# Patient Record
Sex: MALE
[De-identification: names, ages, dates, MRNs, and addresses within clinical notes are randomized per-mention and may not be internally consistent; named-entity substitution may affect disease eponyms.]

---

## 2017-04-24 ENCOUNTER — HOSPITAL ENCOUNTER (EMERGENCY)
Dept: HOSPITAL 42 - ED | Age: 67
Discharge: HOME | End: 2017-04-24
Payer: COMMERCIAL

## 2017-04-24 VITALS
HEART RATE: 65 BPM | OXYGEN SATURATION: 98 % | DIASTOLIC BLOOD PRESSURE: 91 MMHG | TEMPERATURE: 98.1 F | SYSTOLIC BLOOD PRESSURE: 153 MMHG

## 2017-04-24 VITALS — BODY MASS INDEX: 38.2 KG/M2

## 2017-04-24 VITALS — RESPIRATION RATE: 18 BRPM

## 2017-04-24 DIAGNOSIS — M79.675: ICD-10-CM

## 2017-04-24 DIAGNOSIS — M25.562: ICD-10-CM

## 2017-04-24 DIAGNOSIS — M54.9: Primary | ICD-10-CM

## 2017-04-24 NOTE — CT
PROCEDURE:  CT HEAD WITHOUT CONTRAST.



HISTORY:

head injury



COMPARISON:

2/2/2013 



TECHNIQUE:

Axial computed tomography images were obtained through the head/brain 

without intravenous contrast.  



Radiation dose:



Total exam DLP = 822.62 mGy-cm.



This CT exam was performed using one or more of the following dose 

reduction techniques: Automated exposure control, adjustment of the 

mA and/or kV according to patient size, and/or use of iterative 

reconstruction technique.



FINDINGS:



HEMORRHAGE:

No intracranial hemorrhage. 



BRAIN:

No mass effect or edema.  Mild diffuse cerebral atrophy consistent 

with age.  Minimal chronic periventricular white matter ischemic 

change.



VENTRICLES:

Unremarkable. No hydrocephalus. 



CALVARIUM:

Unremarkable.



PARANASAL SINUSES:

Chronic pansinusitis. Retention cysts/ polyps in the maxillary 

sinuses.



MASTOID AIR CELLS:

Unremarkable as visualized. No inflammatory changes.



OTHER FINDINGS:

None.



IMPRESSION:

No intracranial hemorrhage. Mild age-appropriate atrophy and minimal 

chronic white matter ischemic change. Chronic pansinusitis.

## 2017-04-24 NOTE — CT
PROCEDURE:  CT Cervical Spine without contrast



HISTORY:

Trauma



COMPARISON:

None available.



TECHNIQUE:

Axial computed tomography images were obtained of the cervical spine 

without the use of intravenous contrast. Coronal and sagittal 

reformatted images were created and reviewed.



Radiation dose: 



Total exam DLP = 714.76 mGy-cm.



This CT exam was performed using one or more of the following dose 

reduction techniques: Automated exposure control, adjustment of the 

mA and/or kV according to patient size, and/or use of iterative 

reconstruction technique.



FINDINGS:



VERTEBRAE:

No fracture. Normal alignment. No destructive bony lesion.



DISCS/SPINAL CANAL/NEURAL FORAMINA:

No significant central canal or neural foraminal stenosis. Discs 

heights are grossly preserved.



PARASPINAL SOFT TISSUES:

Unremarkable. 



OTHER FINDINGS:

None.



IMPRESSION:

No evidence of fracture or dislocation.

## 2017-04-24 NOTE — ED PDOC
Arrival/HPI





<Cash Auguste - Last Filed: 04/24/17 10:59>





- General


Historian: Patient





<Tabitha Mckeon - Last Filed: 04/24/17 12:44>





- General


Chief Complaint: Trauma


Time Seen by Provider: 04/24/17 09:15





- History of Present Illness


Narrative History of Present Illness (Text): 





04/24/17 12:30


67yo male with history of hypertension in ED with complaint of back pain, neck, 

left knee and great toe pain s/p trauma this morning. States the stairs he was 

standing on collapsed and he fell down 3steps on his back. States he felt dizzy 

s/p, but denies LOC. Denies focal weakness, urinary/fecal incontinence, headache

, nausea, vomiting, any other complaint. (Tabitha Mckeon)








Past Medical History





- Tetanus Immunization


Tetanus Immunization: Unknown





- Cardiac


Hx Cardiac Disorders: Yes


Hx Hypertension: Yes





- Pulmonary


Hx Respiratory Disorders: Yes


Hx Asthma: Yes





- Neurological


Hx Neurological Disorder: No





- HEENT


Hx HEENT Disorder: No


Hx Blind: No





- Renal


Hx Renal Disorder: No





- Endocrine/Metabolic


Hx Endocrine Disorders: No





- Hematological/Oncological


Hx Blood Disorders: No





- Integumentary


Hx Dermatological Disorder: No





- Musculoskeletal/Rheumatological


Hx Musculoskeletal Disorders: No





- Gastrointestinal


Hx Gastrointestinal Disorders: No





- Genitourinary/Gynecological


Hx Genitourinary Disorders: No





- Psychiatric


Hx Psychophysiologic Disorder: No


Hx Depression: No


Hx Emotional Abuse: No


Hx Physical Abuse: No


Hx Substance Use: No





- Past Surgical History


Past Surgical History: No Previous





- Surgical History


Hx Musculoskeletal Surgery: Yes (right knee)





- Suicidal Assessment


Feels Threatened In Home Enviroment: No





<Cash Auguste - Last Filed: 04/24/17 10:59>





- Provider Review


Nursing Documentation Reviewed: Yes





<Tabitha Mckeon - Last Filed: 04/24/17 12:44>





Family/Social History


Smoking Status: Never Smoked


Hx Alcohol Use: No


Hx Substance Use: No


Hx Substance Use Treatment: No





<Cash Auguste - Last Filed: 04/24/17 10:59>





- Physician Review


Nursing Documentation Reviewed: Yes


Family/Social History: Unknown Family HX





<Tabitha Mckeon A - Last Filed: 04/24/17 12:44>





Allergies/Home Meds





<Cash Auguste - Last Filed: 04/24/17 10:59>





<Tabitha Mckeon A - Last Filed: 04/24/17 12:44>


Allergies/Adverse Reactions: 


Allergies





No Known Allergies Allergy (Verified 04/24/17 09:07)


 








Home Medications: 


 Home Meds











 Medication  Instructions  Recorded  Confirmed


 


Albuterol/Ipratropium [Duoneb 3 3 ml IH 02/02/13 05/16/14





mg/0.5 mg (3 ml) UD]   


 


Losartan/Hydrochlorothiazide 1 tab PO DAILY 02/02/13 05/16/14





[Hyzaar 100-12.5 Tablet]   


 


Budesonide/Formoterol Fumarate 1 aer  05/16/14 05/16/14





[Symbicort 80-4.5 Mcg Inhaler]   


 


Mometasone/Formoterol [Dulera 200 1 rose  05/16/14 05/16/14





Mcg/5 Mcg Inhaler]   














Review of Systems





- Physician Review


All systems were reviewed & negative as marked: Yes





- Review of Systems


Constitutional: Normal


Eyes: Normal


ENT: Normal


Respiratory: Normal


Cardiovascular: Normal


Gastrointestinal: Normal


Genitourinary Male: Normal


Musculoskeletal: Arthralgias (LEft knee/great toe), Back Pain, Neck Pain


Skin: Normal


Neurological: Normal


Endocrine: Normal


Hemo/Lymphatic: Normal


Psychiatric: Normal





<Tabitha Mckeon A - Last Filed: 04/24/17 12:44>





Physical Exam


Vital Signs Reviewed: Yes


Temperature: Afebrile


Blood Pressure: Normal


Pulse: Regular


Respiratory Rate: Normal


Appearance: Positive for: Well-Appearing, Non-Toxic, Comfortable


Pain Distress: None


Mental Status: Positive for: Alert and Oriented X 3





- Systems Exam


Head: Present: Atraumatic, Normocephalic


Pupils: Present: PERRL


Extroacular Muscles: Present: EOMI


Conjunctiva: Present: Normal


Mouth: Present: Moist Mucous Membranes


Neck: Present: Normal Range of Motion.  No: MIDLINE TENDERNESS, Paraspinal 

Tenderness


Respiratory/Chest: Present: Clear to Auscultation, Good Air Exchange.  No: 

Respiratory Distress, Accessory Muscle Use


Cardiovascular: Present: Regular Rate and Rhythm, Normal S1, S2.  No: Murmurs


Abdomen: Present: Normal Bowel Sounds.  No: Tenderness, Distention, Peritoneal 

Signs


Back: Present: Midline Tenderness, Paraspinal Tenderness (Diffuse back pain).  

No: Pain with Leg Raise


Upper Extremity: Present: Normal Inspection.  No: Cyanosis, Edema


Lower Extremity: Present: NORMAL PULSES, Tenderness (Left knee and left great 

toe), Neurovascularly Intact, Capillary Refill < 2 s.  No: Edema, Cyanosis, 

Normal ROM (Limited on flexion of left knee secondary to pain), Swelling, 

Erythema, Deformity, Temperature Abnormalties


Neurological: Present: GCS=15, CN II-XII Intact, Speech Normal


Skin: Present: Warm, Dry, Normal Color.  No: Rashes


Psychiatric: Present: Alert, Oriented x 3, Normal Insight, Normal Concentration





<Tabitha Mckeon A - Last Filed: 04/24/17 12:44>





Vital Signs











  Temp Pulse Resp BP Pulse Ox


 


 04/24/17 12:20   68  18  158/79 H  97


 


 04/24/17 11:00   69  18  162/86 H  97


 


 04/24/17 09:09  98.7 F  73  16  166/96 H  97

















Medical Decision Making





<Cash Auguste - Last Filed: 04/24/17 10:59>





<Tabitha Mckeon A - Last Filed: 04/24/17 12:44>


ED Course and Treatment: 





04/24/17 11:00














I was available for consultation during PA evaluation. The chart was reviewed 

by me, and I agree with disposition. The documented history was done by the 

physician extender. The documented physical exam was done by the physician 

extender. The documented procedures were done by the physician extender.  (

Cash Auguste)








- RAD Interpretation


Radiology Orders: 











04/24/17 09:21


CERVICAL SPINE W/O CONTRAST [CT] Stat 


HEAD W/O CONTRAST [CT] Stat 





04/24/17 09:22


FOOT LEFT GREAT TOE ROUTINE [RAD] Stat 


LS SPINE WITH OBL > 18 YRS OLD [RAD] Stat 





04/24/17 11:09


KNEE LEFT 2 VIEWS (AP & LAT) [RAD] Stat 

















- Medication Orders


Current Medication Orders: 














Discontinued Medications





Oxycodone/Acetaminophen (Percocet 5/325 Mg Tab)  1 tab PO STAT STA


   Stop: 04/24/17 09:24


   Last Admin: 04/24/17 09:46  Dose: 1 TAB














Disposition/Present on Arrival





- Present on Arrival


History of DVT/PE: No


History of Uncontrolled Diabetes: No


Urinary Catheter: No


History of Decub. Ulcer: No


History Surgical Site Infection Following: None





<Cash Auguste - Last Filed: 04/24/17 10:59>





- Present on Arrival


Any Indicators Present on Arrival: Yes


History of DVT/PE: No


History of Uncontrolled Diabetes: No


Urinary Catheter: No


History of Decub. Ulcer: No


History Surgical Site Infection Following: None





- Disposition


Have Diagnosis and Disposition been Completed?: Yes


Disposition Time: 12:35


Patient Plan: Discharge





<Tabitha Mckeon - Last Filed: 04/24/17 12:44>





- Disposition


Diagnosis: 


 Back pain, Knee pain, Toe pain, Abrasion


Disposition: HOME/ ROUTINE


Condition: STABLE


Discharge Instructions (ExitCare):  Musculoskeletal Pain (ED)


Additional Instructions: 


Follow up with your doctor/Orthopedist


Return to ED For any new or worsening symptoms


Prescriptions: 


Cyclobenzaprine [Flexeril] 5 mg PO BID #10 tab


traMADol [Ultram] 50 mg PO TID #10 tab


Referrals: 


Byron Kearns DO [Staff Provider] - Follow up with primary

## 2017-04-24 NOTE — RAD
PROCEDURE:  Left Foot Radiographs.



HISTORY:

great toe pain s/p trauma  



COMPARISON:

None.



FINDINGS:



BONES:

Normal. No fracture. 



JOINTS:

Mild degenerative changes are seen in the 1st MTP joint. There is 

mild hallux valgus angulation. 



SOFT TISSUES:

Normal. 



OTHER FINDINGS:

None.



IMPRESSION:

No acute fracture

## 2017-04-24 NOTE — RAD
PROCEDURE:  Left Knee Radiographs.



HISTORY:

Pain.



COMPARISON:

None.



FINDINGS:



BONES:

Normal. No fracture. 



JOINTS:

There is mild joint space narrowing in the lateral compartment and 

the patellofemoral joint 



JOINT EFFUSION:

None. 



OTHER FINDINGS:

None.



IMPRESSION:

There is mild joint space narrowing in the lateral compartment and 

the patellofemoral joint

## 2017-05-03 ENCOUNTER — HOSPITAL ENCOUNTER (EMERGENCY)
Dept: HOSPITAL 42 - ED | Age: 67
Discharge: LEFT BEFORE DISCHARGE | End: 2017-05-03
Payer: MEDICARE

## 2017-05-03 VITALS — BODY MASS INDEX: 38.2 KG/M2

## 2017-05-03 VITALS — DIASTOLIC BLOOD PRESSURE: 54 MMHG | SYSTOLIC BLOOD PRESSURE: 168 MMHG

## 2017-05-03 VITALS — TEMPERATURE: 98.2 F | HEART RATE: 64 BPM

## 2017-05-03 VITALS — RESPIRATION RATE: 18 BRPM | OXYGEN SATURATION: 99 %

## 2017-05-03 DIAGNOSIS — J45.901: Primary | ICD-10-CM

## 2017-05-03 LAB
ADD MANUAL DIFF?: NO
ALBUMIN/GLOB SERPL: 1.2 {RATIO} (ref 1.1–1.8)
ALP SERPL-CCNC: 135 U/L (ref 38–133)
ALT SERPL-CCNC: 52 U/L (ref 7–56)
APTT BLD: 27.5 SECONDS (ref 23.7–30.8)
AST SERPL-CCNC: 31 U/L (ref 15–59)
BASOPHILS # BLD AUTO: 0.02 K/MM3 (ref 0–2)
BASOPHILS NFR BLD: 0.3 % (ref 0–3)
BILIRUB SERPL-MCNC: 0.8 MG/DL (ref 0.2–1.3)
BUN SERPL-MCNC: 18 MG/DL (ref 7–21)
CALCIUM SERPL-MCNC: 9.2 MG/DL (ref 8.4–10.5)
CHLORIDE SERPL-SCNC: 99 MMOL/L (ref 98–107)
CO2 SERPL-SCNC: 29 MMOL/L (ref 21–33)
EOSINOPHIL # BLD: 0.5 10*3/UL (ref 0–0.7)
EOSINOPHIL NFR BLD: 7.9 % (ref 1.5–5)
ERYTHROCYTE [DISTWIDTH] IN BLOOD BY AUTOMATED COUNT: 12.6 % (ref 11.5–14.5)
GLOBULIN SER-MCNC: 3.6 GM/DL
GLUCOSE SERPL-MCNC: 110 MG/DL (ref 70–110)
GRANULOCYTES # BLD: 3.68 10*3/UL (ref 1.4–6.5)
GRANULOCYTES NFR BLD: 63 % (ref 50–68)
HCT VFR BLD CALC: 41.4 % (ref 42–52)
INR PPP: 1.04 (ref 0.93–1.08)
LYMPHOCYTES # BLD: 1.3 10*3/UL (ref 1.2–3.4)
LYMPHOCYTES NFR BLD AUTO: 22.3 % (ref 22–35)
MCH RBC QN AUTO: 31 PG (ref 25–35)
MCHC RBC AUTO-ENTMCNC: 35.7 G/DL (ref 31–37)
MCV RBC AUTO: 86.8 FL (ref 80–105)
MONOCYTES # BLD AUTO: 0.4 10*3/UL (ref 0.1–0.6)
MONOCYTES NFR BLD: 6.5 % (ref 1–6)
PLATELET # BLD: 187 10^3/UL (ref 120–450)
PMV BLD AUTO: 9.2 FL (ref 7–11)
POTASSIUM SERPL-SCNC: 3.9 MMOL/L (ref 3.6–5)
PROT SERPL-MCNC: 7.8 G/DL (ref 5.8–8.3)
SODIUM SERPL-SCNC: 138 MMOL/L (ref 132–148)
TROPONIN I SERPL-MCNC: < 0.01 NG/ML
WBC # BLD AUTO: 5.8 10^3/UL (ref 4.5–11)

## 2017-05-03 PROCEDURE — 85610 PROTHROMBIN TIME: CPT

## 2017-05-03 PROCEDURE — 93005 ELECTROCARDIOGRAM TRACING: CPT

## 2017-05-03 PROCEDURE — 83880 ASSAY OF NATRIURETIC PEPTIDE: CPT

## 2017-05-03 PROCEDURE — 80053 COMPREHEN METABOLIC PANEL: CPT

## 2017-05-03 PROCEDURE — 84484 ASSAY OF TROPONIN QUANT: CPT

## 2017-05-03 PROCEDURE — 71010: CPT

## 2017-05-03 PROCEDURE — 96374 THER/PROPH/DIAG INJ IV PUSH: CPT

## 2017-05-03 PROCEDURE — 82550 ASSAY OF CK (CPK): CPT

## 2017-05-03 PROCEDURE — 82948 REAGENT STRIP/BLOOD GLUCOSE: CPT

## 2017-05-03 PROCEDURE — 87040 BLOOD CULTURE FOR BACTERIA: CPT

## 2017-05-03 PROCEDURE — 99284 EMERGENCY DEPT VISIT MOD MDM: CPT

## 2017-05-03 PROCEDURE — 83615 LACTATE (LD) (LDH) ENZYME: CPT

## 2017-05-03 PROCEDURE — 85025 COMPLETE CBC W/AUTO DIFF WBC: CPT

## 2017-05-03 PROCEDURE — 85730 THROMBOPLASTIN TIME PARTIAL: CPT

## 2017-05-03 RX ADMIN — IPRATROPIUM BROMIDE AND ALBUTEROL SULFATE SCH ML: .5; 3 SOLUTION RESPIRATORY (INHALATION) at 14:37

## 2017-05-03 RX ADMIN — IPRATROPIUM BROMIDE AND ALBUTEROL SULFATE SCH ML: .5; 3 SOLUTION RESPIRATORY (INHALATION) at 14:09

## 2017-05-03 RX ADMIN — IPRATROPIUM BROMIDE AND ALBUTEROL SULFATE SCH ML: .5; 3 SOLUTION RESPIRATORY (INHALATION) at 13:42

## 2017-05-03 NOTE — CARD
--------------- APPROVED REPORT --------------





EKG Measurement

Heart [id number]

[place] [id number]

[id number]

[id number]

[id number]



<Conclusion>

*** Poor data quality, interpretation may be adversely affected

Sinus bradycardia

Otherwise normal ECG

## 2017-05-03 NOTE — ED PDOC
Arrival/HPI





- General


Time Seen by Provider: 05/03/17 13:20


Historian: Patient





- History of Present Illness


Narrative History of Present Illness (Text): 





05/03/17 13:26


Carlos Zaragoza is a 66 year old male, with a history of asthma, presents to the 

emergency department complaining of several week duration of shortness of 

breath. Patient was evaluated by pulmonoloist, , today and was 

advised to present to emergency department for further evaluation. States that 

he used inhaler treatments at home today morning for minimal relief. States 

that shortness of breath is worsened with exertion. Denies chest pain. Denies 

fever, chills, headache, dizziness, nausea, vomiting, diarrhea, or any other 

complaints at this time. 





Time/Duration: > week (several weeks )


Symptom Onset: Gradual


Symptom Course: Worsening


Severity Level: Mild


Activities at Onset: Light





Past Medical History





- Provider Review


Nursing Documentation Reviewed: Yes





- Tetanus Immunization


Tetanus Immunization: Unknown





- Cardiac


Hx Cardiac Disorders: Yes


Hx Hypertension: Yes





- Pulmonary


Hx Respiratory Disorders: Yes


Hx Asthma: Yes





- Neurological


Hx Neurological Disorder: No





- HEENT


Hx HEENT Disorder: No


Hx Blind: No





- Renal


Hx Renal Disorder: No





- Endocrine/Metabolic


Hx Endocrine Disorders: No





- Hematological/Oncological


Hx Blood Disorders: No





- Integumentary


Hx Dermatological Disorder: No





- Musculoskeletal/Rheumatological


Hx Musculoskeletal Disorders: No





- Gastrointestinal


Hx Gastrointestinal Disorders: No





- Genitourinary/Gynecological


Hx Genitourinary Disorders: No





- Psychiatric


Hx Psychophysiologic Disorder: No


Hx Depression: No


Hx Emotional Abuse: No


Hx Physical Abuse: No


Hx Substance Use: No





- Past Surgical History


Past Surgical History: No Previous





- Surgical History


Hx Musculoskeletal Surgery: Yes (right knee)





- Suicidal Assessment


Feels Threatened In Home Enviroment: No





Family/Social History





- Physician Review


Nursing Documentation Reviewed: Yes


Family/Social History: No Known Family HX


Smoking Status: Never Smoked


Hx Alcohol Use: No


Hx Substance Use: No


Hx Substance Use Treatment: No





Allergies/Home Meds


Allergies/Adverse Reactions: 


Allergies





No Known Allergies Allergy (Verified 05/03/17 13:26)


 








Home Medications: 


 Home Meds











 Medication  Instructions  Recorded  Confirmed


 


Albuterol/Ipratropium [Duoneb 3 3 ml IH 02/02/13 05/16/14





mg/0.5 mg (3 ml) UD]   


 


Losartan/Hydrochlorothiazide 1 tab PO DAILY 02/02/13 05/16/14





[Hyzaar 100-12.5 Tablet]   


 


Budesonide/Formoterol Fumarate 1 aer  05/16/14 05/16/14





[Symbicort 80-4.5 Mcg Inhaler]   


 


Mometasone/Formoterol [Dulera 200 1 rose  05/16/14 05/16/14





Mcg/5 Mcg Inhaler]   














Review of Systems





- Review of Systems


Constitutional: absent: Fatigue, Fevers


Respiratory: SOB, Wheezing.  absent: Cough, Sputum


Cardiovascular: [place]AMM[place].  absent: Chest Pain, Palpitations, Edema, Calf Pain


Gastrointestinal: absent: Abdominal Pain, Diarrhea, Nausea, Vomiting


Genitourinary Male: absent: Dysuria, Frequency


Musculoskeletal: absent: Back Pain


Skin: absent: Rash


Neurological: absent: Headache, Dizziness





Physical Exam





- Physical Exam


Narrative Physical Exam (Text): 





05/03/17 13:31


Head:  Atraumatic.  Normocephalic. 


Eyes:  PERRL.  EOMI.  Conjunctivae are not pale.


ENT:  Mucous membranes are moist and intact.  Oropharynx is clear and 

symmetric. No pharyngeal erythema or exudates. No drooling or stridor.


Neck:  Supple.  Full ROM.  No JVD.  No lymphadenopathy.


Cardiovascular:  Regular rate.  Regular rhythm.  No murmurs, rubs, or gallops.  

Distal pulses are 2+ and symmetric.


Pulmonary/Chest:  No evidence of respiratory distress. bilateral expiratory 

wheeze with prolonged expiratory phase.  No rales or rhonchi.


Abdominal:  Soft and non-distended.  There is no tenderness.  No rebound, 

guarding, or rigidity.  No organomegaly.  Good bowel sounds. 


Back:  No CVA tenderness.


Extremities:  No edema.   No cyanosis.  No clubbing.  Full range of motion in 

all extremities.  No calf tenderness.


Skin:  Skin is warm and dry.  No petechiae.  No purpura. 


Neurological:  Alert, awake, and oriented to person, place, time, and 

situation.  Normal speech. Motor and sensory intact.


Psychiatric:  Good eye contact.  Normal interaction, affect, and behavior.














Vital Signs Reviewed: Yes


Vital Signs











  Temp Pulse Resp BP Pulse Ox


 


 05/03/17 13:35    18   99


 


 05/03/17 13:19  98.2 F  64  20  168/54 H  98











Temperature: Afebrile


Blood Pressure: Normal


Pulse: Regular


Appearance: Positive for: Non-Toxic, Comfortable


Pain Distress: Mild


Mental Status: Positive for: Alert and Oriented X 3





Medical Decision Making


ED Course and Treatment: 





05/03/17 13:33


Impression: A 66 year old male who presents to the emergency department 

complaining of several week duration of worsening shortness of breath.





Differential Diagnosis include but are not limited to:  





Plan:


-- EKG


-- Labs, cardiac enzymes


-- Chest X-ray


-- Duoneb


-- Solumedrol


-- Blood culture


-- Urine culture


-- Urinalysis


-- Reassess and disposition








Progress Notes:


Patient with diffuse wheezing noted on exam. Patient administered multiple 

nebulizers and iv steroids, on reassessment wheezing persists. He denies chest 

pain, he states he feels better. On re-exam however he is persistently wheezing 

despite multiple nebulizers. Abnormal chest xray reviewed with patient, risk of 

lung disease, cardiac disease reviewed with patient. He states he wishes to go 

home because he feels better and will follow-up with his physician. Risks 

discussed with patient in laymen's terms. He will sign out against medical 

advice.





05/03/17 15:25


Leaving Against Medical Advice (AMA):


The patient is choosing to leave against medical advice.  I have personally 

explained to the patient that choosing to do so may result in permanent bodily 

harm or death.  I have discussed at great length that without further 

evaluation and monitoring there may be unforeseen circumstances and/or 

deterioration causing permanent bodily harm or death as a result of their 

choice. The patient is alert, oriented, and shows the mental capacity to make 

clear decisions regarding the patients health care at this time. The patient 

continues to wish to leave against medical advice.  


The patient has been advised that they should return to the emergency room 

immediately if they change their mind at any time, or if their condition begins 

to change or worsen in any way.














- Lab Interpretations


Microbiology Results: 


Microbiology Results





05/03/17 13:40   Blood   Blood Culture - Preliminary


                            NO GROWTH AFTER 4 DAYS


05/03/17 13:25   Blood   Blood Culture - Preliminary


                            NO GROWTH AFTER 4 DAYS








Lab Results: 








 05/03/17 13:25 





 05/03/17 13:25 





 Lab Results





05/03/17 13:32: POC Glucose (mg/dL) 110


05/03/17 13:25: PT 11.2, INR 1.04, APTT 27.5


05/03/17 13:25: WBC 5.8  D, RBC 4.77, Hgb 14.8, Hct 41.4 L, MCV 86.8, MCH 31.0, 

MCHC 35.7, RDW 12.6, Plt Count 187, MPV 9.2, Gran % 63.0, Lymph % (Auto) 22.3, 

Mono % (Auto) 6.5 H, Eos % (Auto) 7.9 H, Baso % (Auto) 0.3, Gran # 3.68, Lymph 

# 1.3, Mono # 0.4, Eos # 0.5, Baso # 0.02


05/03/17 13:25: Sodium 138, Potassium 3.9, Chloride 99, Carbon Dioxide 29, 

Anion Gap 14, BUN 18, Creatinine 0.8, Est GFR (African Amer) > 60, Est GFR (Non-

Af Amer) > 60, Random Glucose 110, Calcium 9.2, Total Bilirubin 0.8, AST 31, 

ALT 52, Alkaline Phosphatase 135 H, Lactate Dehydrogenase 568, Total Creatine 

Kinase 142, Troponin I < 0.01, NT-Pro-B Natriuret Pep 71.7, Total Protein 7.8, 

Albumin 4.2, Globulin 3.6, Albumin/Globulin Ratio 1.2











- RAD Interpretation


Radiology Orders: 








05/03/17 13:27


CHEST PORTABLE [RAD] Stat 














- Medication Orders


Current Medication Orders: 











Discontinued Medications





Albuterol/Ipratropium (Duoneb 3 Mg/0.5 Mg (3 Ml) Ud)  3 ml IH Q15M [id number]


   Stop: 05/03/17 14:01


   Last Admin: 05/03/17 14:37  Dose: 3 ml





Methylprednisolone (Solu-Medrol)  125 mg IVP STAT STA


   Stop: 05/03/17 13:28


   Last Admin: 05/03/17 13:42  Dose: 125 mg











- Julianneibe Statement


The provider has reviewed the documentation as recorded by the Yana Stockton 


Provider Attestation: 








All medical record entries made by the Yana were at my direction and 

personally dictated by me. I have reviewed the chart and agree that the record 

accurately reflects my personal performance of the history, physical exam, 

medical decision making, and the department course for this patient. I have 

also personally directed, reviewed, and agree with the discharge instructions 

and disposition.





Disposition/Present on Arrival





- Present on Arrival


Any Indicators Present on Arrival: No


History of DVT/PE: No


History of Uncontrolled Diabetes: No


Urinary Catheter: No


History Surgical Site Infection Following: None





- Disposition


Have Diagnosis and Disposition been Completed?: Yes


Diagnosis: 


 Asthma exacerbation





Disposition: AGAINST MEDICAL ADVICE


Disposition Time: 15:45


Patient Plan: Discharge


Condition: GOOD


Referrals: 


Rafy Noel MD [Primary Care Provider] - Follow up with primary

## 2017-05-03 NOTE — RAD
HISTORY:

sob  



COMPARISON:

1/23/2017 



FINDINGS:



LUNGS:

No active pulmonary disease.



PLEURA:

No significant pleural effusion identified, no pneumothorax apparent.



CARDIOVASCULAR:

Cardiomegaly slightly more conspicuous -possibly due to interval 

change in technique



 Minimal pulmonary venous congestion possible 



OSSEOUS STRUCTURES:

Thoracic spondylosis



VISUALIZED UPPER ABDOMEN:

Normal.



OTHER FINDINGS:

None.



IMPRESSION:

Cardiomegaly  minimal pulmonary venous congestion possible. No 

pleural effusion or consolidation

## 2018-05-27 ENCOUNTER — HOSPITAL ENCOUNTER (EMERGENCY)
Dept: HOSPITAL 42 - ED | Age: 68
Discharge: HOME | End: 2018-05-27
Payer: MEDICARE

## 2018-05-27 VITALS — HEART RATE: 64 BPM | DIASTOLIC BLOOD PRESSURE: 64 MMHG | SYSTOLIC BLOOD PRESSURE: 126 MMHG

## 2018-05-27 VITALS — RESPIRATION RATE: 18 BRPM

## 2018-05-27 VITALS — OXYGEN SATURATION: 98 % | TEMPERATURE: 98.1 F

## 2018-05-27 VITALS — BODY MASS INDEX: 34.9 KG/M2

## 2018-05-27 DIAGNOSIS — I10: ICD-10-CM

## 2018-05-27 DIAGNOSIS — R07.89: ICD-10-CM

## 2018-05-27 DIAGNOSIS — E78.5: ICD-10-CM

## 2018-05-27 DIAGNOSIS — J40: ICD-10-CM

## 2018-05-27 DIAGNOSIS — J45.901: Primary | ICD-10-CM

## 2018-05-27 DIAGNOSIS — I25.10: ICD-10-CM

## 2018-05-27 LAB
ALBUMIN SERPL-MCNC: 4 G/DL (ref 3–4.8)
ALBUMIN/GLOB SERPL: 1.4 {RATIO} (ref 1.1–1.8)
ALT SERPL-CCNC: 43 U/L (ref 7–56)
ARTERIAL BLOOD GAS O2 SAT: 99.2 % (ref 95–98)
ARTERIAL BLOOD GAS PCO2: 29 MM/HG (ref 35–45)
ARTERIAL BLOOD GAS TCO2: 25.7 MMOL.L (ref 22–28)
AST SERPL-CCNC: 31 U/L (ref 17–59)
BASOPHILS # BLD AUTO: 0.02 K/MM3 (ref 0–2)
BASOPHILS NFR BLD: 0.4 % (ref 0–3)
BUN SERPL-MCNC: 18 MG/DL (ref 7–21)
CALCIUM SERPL-MCNC: 9.4 MG/DL (ref 8.4–10.5)
EOSINOPHIL # BLD: 0.4 10*3/UL (ref 0–0.7)
EOSINOPHIL NFR BLD: 6.7 % (ref 1.5–5)
ERYTHROCYTE [DISTWIDTH] IN BLOOD BY AUTOMATED COUNT: 12.8 % (ref 11.5–14.5)
GFR NON-AFRICAN AMERICAN: > 60
GRANULOCYTES # BLD: 3.34 10*3/UL (ref 1.4–6.5)
GRANULOCYTES NFR BLD: 60.5 % (ref 50–68)
HCO3 BLDA-SCNC: 24.8 MMOL/L (ref 21–28)
HGB BLD-MCNC: 13.8 G/DL (ref 14–18)
INHALED O2 CONCENTRATION: 40 %
LYMPHOCYTES # BLD: 1.4 10*3/UL (ref 1.2–3.4)
LYMPHOCYTES NFR BLD AUTO: 24.8 % (ref 22–35)
MCH RBC QN AUTO: 31.2 PG (ref 25–35)
MCHC RBC AUTO-ENTMCNC: 36.5 G/DL (ref 31–37)
MCV RBC AUTO: 85.5 FL (ref 80–105)
MONOCYTES # BLD AUTO: 0.4 10*3/UL (ref 0.1–0.6)
MONOCYTES NFR BLD: 7.6 % (ref 1–6)
PH BLDA: 7.54 [PH] (ref 7.35–7.45)
PLATELET # BLD: 197 10^3/UL (ref 120–450)
PMV BLD AUTO: 9.5 FL (ref 7–11)
PO2 BLDA: 191 MM/HG (ref 80–100)
RBC # BLD AUTO: 4.42 10^6/UL (ref 3.5–6.1)
TROPONIN I SERPL-MCNC: < 0.01 NG/ML
WBC # BLD AUTO: 5.5 10^3/UL (ref 4.5–11)

## 2018-05-27 PROCEDURE — 80053 COMPREHEN METABOLIC PANEL: CPT

## 2018-05-27 PROCEDURE — 36600 WITHDRAWAL OF ARTERIAL BLOOD: CPT

## 2018-05-27 PROCEDURE — 96374 THER/PROPH/DIAG INJ IV PUSH: CPT

## 2018-05-27 PROCEDURE — 82803 BLOOD GASES ANY COMBINATION: CPT

## 2018-05-27 PROCEDURE — 85025 COMPLETE CBC W/AUTO DIFF WBC: CPT

## 2018-05-27 PROCEDURE — 82550 ASSAY OF CK (CPK): CPT

## 2018-05-27 PROCEDURE — 83615 LACTATE (LD) (LDH) ENZYME: CPT

## 2018-05-27 PROCEDURE — 99285 EMERGENCY DEPT VISIT HI MDM: CPT

## 2018-05-27 PROCEDURE — 71045 X-RAY EXAM CHEST 1 VIEW: CPT

## 2018-05-27 PROCEDURE — 93005 ELECTROCARDIOGRAM TRACING: CPT

## 2018-05-27 PROCEDURE — 83735 ASSAY OF MAGNESIUM: CPT

## 2018-05-27 PROCEDURE — 84484 ASSAY OF TROPONIN QUANT: CPT

## 2018-05-27 RX ADMIN — IPRATROPIUM BROMIDE AND ALBUTEROL SULFATE SCH ML: .5; 3 SOLUTION RESPIRATORY (INHALATION) at 11:20

## 2018-05-27 RX ADMIN — IPRATROPIUM BROMIDE AND ALBUTEROL SULFATE SCH ML: .5; 3 SOLUTION RESPIRATORY (INHALATION) at 11:37

## 2018-05-27 RX ADMIN — IPRATROPIUM BROMIDE AND ALBUTEROL SULFATE SCH ML: .5; 3 SOLUTION RESPIRATORY (INHALATION) at 11:52

## 2018-05-27 NOTE — ED PDOC
Arrival/HPI





- General


Chief Complaint: Shortness Of Breath


Time Seen by Provider: 05/27/18 11:22


Historian: Patient





- History of Present Illness


Narrative History of Present Illness (Text): 





05/27/18 11:32


This 68 yo male with pmh asthma, COPD, HTN, hyperlipidemia, CAD ( 2 coronary 

stents in 2015) presents to this ED c/o intermittent SOB x 2 weeks.  Patient 

stated last episode started 7 hours ago with cough, mid-sternal CP.  Patient 

feels mild dizzy when he cough.  Patient denies fever, recent travel, sick 

contact, neck stiffness, palpitation, n/v, abdominal pain, dysarthria, dysphagia

, ha, or diplopia.





Patient stated he had an echocardiorgram, EKG, and nuclear stress test x 7 

months ago, done by Dr. Azar, Cardiologist.


Patient saw Dr. Azar again, 2 months ago for medical clearance (cardiology) 

for surgery.  He said cardiologist stated patient was fit for surgery.





Dr. Yan, Pulmonologist


Dr. Ly, PMD


Dr. Azar, Cardiologist


Time/Duration: Other (see hpi)


Context: Home





Past Medical History





- Provider Review


Nursing Documentation Reviewed: Yes





- Infectious Disease


Hx of Infectious Diseases: None





- Tetanus Immunization


Tetanus Immunization: Unknown





- Cardiac


Hx Cardiac Disorders: Yes


Hx Hypertension: Yes





- Pulmonary


Hx Respiratory Disorders: Yes


Hx Asthma: Yes





- Neurological


Hx Neurological Disorder: No





- HEENT


Hx HEENT Disorder: No


Hx Blind: No





- Renal


Hx Renal Disorder: No





- Endocrine/Metabolic


Hx Endocrine Disorders: No





- Hematological/Oncological


Hx Blood Disorders: No





- Integumentary


Hx Dermatological Disorder: No





- Musculoskeletal/Rheumatological


Hx Musculoskeletal Disorders: No





- Gastrointestinal


Hx Gastrointestinal Disorders: No





- Genitourinary/Gynecological


Hx Genitourinary Disorders: No





- Psychiatric


Hx Psychophysiologic Disorder: No


Hx Depression: No


Hx Emotional Abuse: No


Hx Physical Abuse: No


Hx Substance Use: No





- Past Surgical History


Past Surgical History: No Previous





- Surgical History


Hx Musculoskeletal Surgery: Yes (right knee)





- Anesthesia


Hx Anesthesia: Yes


Hx Anesthesia Reactions: No


Hx Malignant Hyperthermia: No





- Suicidal Assessment


Feels Threatened In Home Enviroment: No





Family/Social History





- Physician Review


Nursing Documentation Reviewed: Yes


Family/Social History: Other (noncontributory)


Smoking Status: Never Smoked


Hx Alcohol Use: No


Hx Substance Use: No


Hx Substance Use Treatment: No





Allergies/Home Meds


Allergies/Adverse Reactions: 


Allergies





No Known Allergies Allergy (Verified 05/03/17 13:26)


 








Home Medications: 


 Home Meds











 Medication  Instructions  Recorded  Confirmed


 


Albuterol Sulfate [Proair Hfa] 1 puff IH QID 05/27/18 05/27/18


 


Atorvastatin [Lipitor] 1 tab PO HS 05/27/18 05/27/18


 


Dexlansoprazole [Dexilant] 1 tab PO DAILY 05/27/18 05/27/18


 


Mometasone/Formoterol [Dulera 200 1 puff IH DAILY 05/27/18 05/27/18





Mcg/5 Mcg Inhaler]   


 


Tiotropium [Spiriva] 1 cap IH DAILY 05/27/18 05/27/18


 


Valsartan/Hydrochlorothiazide 1 tab PO DAILY 05/27/18 05/27/18





[Valsartan-Hctz 320-25 mg Tab]   


 


amLODIPine [Norvasc] 1 tab PO DAILY 05/27/18 05/27/18














Review of Systems





- Review of Systems


Constitutional: Normal.  absent: Fatigue, Weight Change, Fevers


Eyes: Normal


ENT: Normal


Respiratory: SOB, Cough, Sputum, Wheezing


Cardiovascular: Chest Pain.  absent: Palpitations, Edema, Calf Pain, [place], 

Orthopnea, Syncope


Gastrointestinal: Normal.  absent: Abdominal Pain, Nausea, Vomiting


Genitourinary Male: Normal.  absent: Dysuria, Frequency, Hematuria


Musculoskeletal: Normal.  absent: Back Pain, Neck Pain, Myalgias


Skin: Normal.  absent: Rash


Neurological: Normal.  absent: Headache, Dizziness, Focal Weakness, Gait Changes

, Speech Changes, Facial Droop, Disequilibrium, Seizure


Endocrine: Normal


Hemo/Lymphatic: Normal


Psychiatric: Normal





Physical Exam


Vital Signs











  Temp Pulse Resp BP Pulse Ox


 


 05/27/18 16:13   69  18  128/68  98


 


 05/27/18 14:13   74  18  131/71  98


 


 05/27/18 12:28   86  18  138/78  98


 


 05/27/18 11:39    24   98


 


 05/27/18 11:19  98.1 F  94 H  20  142/83  98











Temperature: Afebrile


Blood Pressure: Normal


Pulse: Regular


Respiratory Rate: Normal


Appearance: Positive for: Well-Appearing, Non-Toxic, Comfortable


Pain Distress: None


Mental Status: Positive for: Alert and Oriented X 3





- Systems Exam


Head: Present: Atraumatic, Normocephalic


Pupils: Present: PERRL


Extroacular Muscles: Present: EOMI


Conjunctiva: Present: Normal


Mouth: Present: Moist Mucous Membranes


Neck: Present: Normal Range of Motion


Respiratory/Chest: Present: Wheezes, Decreased Breath Sounds.  No: Respiratory 

Distress, Accessory Muscle Use, Rales, Retracting, Rhonchi, Tachypneic, Tender 

to Palpation


Cardiovascular: Present: Regular Rate and Rhythm, Normal S1, S2.  No: Murmurs


Abdomen: No: Tenderness, Distention, Peritoneal Signs, Rebound, Guarding


Back: Present: Normal Inspection.  No: CVA Tenderness


Upper Extremity: Present: Normal Inspection, Normal ROM.  No: Cyanosis, Edema


Lower Extremity: Present: Normal Inspection, Normal ROM.  No: Edema


Neurological: Present: GCS=15, CN II-XII Intact, Speech Normal, Motor Func 

Grossly Intact, Normal Sensory Function, Normal Cerebellar Funct, Gait Normal, 

Memory Normal


Skin: Present: Warm, Dry, Normal Color.  No: Rashes


Psychiatric: Present: Alert, Oriented x 3, Normal Insight, Normal Concentration





Medical Decision Making


ED Course and Treatment: 





05/27/18 13:46


Patient stated he is feeling well, and his symptoms had significantly improved.

  I told patient due to his risk factors, I will order a repeat Troponin and 

EKG again in 5 hours.  Patient agreed with plan.


05/27/18 18:09


Repeat EKG:  NSR @ 82 bpm.  No ST changes .  No changes from previous EKG


05/27/18 18:24


Re-evaluation.  Patient feels better.  Discussed results and plan with patient 

who expresses understanding.  All questions answered and there is agreement 

with the plan to discharge home with instructions.  Patient stable for 

discharge.  Return if symptoms persist or worsen.





Lungs CTA b/l, no CP, No SOB, no wheezing, rales, or rhonchi.  Second Troponin 

was negative.  Patient is requesting Prednisone prescription, and Albuterol to 

control his asthma symptoms.  He said he understands Prednisone and Solumedro 

are associated with increased risk for DM, and AVN, osteoporosis, glaucoma.  He 

knows the risk, bu he insisted to get prescription for Prednisone.  He said he 

will contact PMD in 2 days.  Patient was recommended to return to emergency if 

symptoms worsen.  Patient understood that he can stop taking Prednisone at 

anytime if symptoms resolves.


Re-evaluation Time: 18:28


Reassessment Condition: Re-examined, Improved





- Lab Interpretations


Lab Results: 








 05/27/18 11:30 





 05/27/18 11:30 





 Lab Results





05/27/18 17:15: Troponin I < 0.01


05/27/18 11:45: pCO2 29 L, pO2 191.0 H, HCO3 24.8, ABG pH 7.54 H, ABG Total CO2 

25.7, ABG O2 Saturation 99.2 H, ABG Base Excess 3.1 H, ABG Potassium 2.9 L, 

Glucose 270 H, Lactate 1.7, FiO2 40.0, Sodium 139.0, Chloride 108.0 H, Arterial 

Blood Potassium 2.9 L


05/27/18 11:30: Sodium 141, Potassium 4.2, Chloride 98, Carbon Dioxide 30, 

Anion Gap 17, BUN 18, Creatinine 0.8, Est GFR (African Amer) > 60, Est GFR (Non-

Af Amer) > 60, Random Glucose 270 H, Calcium 9.4, Magnesium 1.7, Total 

Bilirubin 0.7, AST 31, ALT 43, Alkaline Phosphatase 155 H, Lactate 

Dehydrogenase 623, Total Creatine Kinase 106, Troponin I < 0.01, Total Protein 

6.9, Albumin 4.0, Globulin 2.9, Albumin/Globulin Ratio 1.4


05/27/18 11:30: WBC 5.5, RBC 4.42, Hgb 13.8 L, Hct 37.8 L, MCV 85.5, MCH 31.2, 

MCHC 36.5, RDW 12.8, Plt Count 197, MPV 9.5, Gran % 60.5, Lymph % (Auto) 24.8, 

Mono % (Auto) 7.6 H, Eos % (Auto) 6.7 H, Baso % (Auto) 0.4, Gran # 3.34, Lymph 

# (Auto) 1.4, Mono # (Auto) 0.4, Eos # (Auto) 0.4, Baso # (Auto) 0.02








I have reviewed the lab results: Yes


Interpretation: No clinic. lab abnormalty





- RAD Interpretation


Narrative RAD Interpretations (Text): 





05/27/18 13:15


HISTORY:


SOB





COMPARISON:


Comparison chest 04/30/2018 





FINDINGS:





LUNGS:


Poor inspiration with low lung volumes, crowded bronchovascular markings and 

mild bibasilar atelectasis 





PLEURA:


No significant pleural effusion identified, no pneumothorax apparent.





CARDIOVASCULAR:


Cardiomegaly. 





OSSEOUS STRUCTURES:


No significant abnormalities.





VISUALIZED UPPER ABDOMEN:


Normal.





OTHER FINDINGS:


None.





IMPRESSION:


Poor inspiration with low lung volumes, crowded bronchovascular markings and 

mild bibasilar atelectasis  Cardiomegaly.














Radiology Orders: 








05/27/18 11:33


CHEST PORTABLE [RAD] Stat 














- EKG Interpretation


Interpreted by ED Physician: Yes (NSR @ 69 bpm.  No ST changes)


Type: 12 lead EKG


Comparison: Similar to previous EKG





- Medication Orders


Current Medication Orders: 











Discontinued Medications





Albuterol/Ipratropium (Duoneb 3 Mg/0.5 Mg (3 Ml) Ud)  3 ml IH Q15M [id number]


   Stop: 05/27/18 12:16


   Last Admin: 05/27/18 11:52  Dose: 3 ml





Methylprednisolone (Solu-Medrol)  125 mg IVP STAT STA


   Stop: 05/27/18 11:36


   Last Admin: 05/27/18 11:48  Dose: 125 mg





IVP Administration


 Document     05/27/18 11:48  GMD  (Rec: 05/27/18 11:48  GMD  [id number])


     Charges for Administration


      # of IVP Administrations                   1














Disposition/Present on Arrival





- Present on Arrival


Any Indicators Present on Arrival: No


History of DVT/PE: No


History of Uncontrolled Diabetes: No


Urinary Catheter: No


History of Decub. Ulcer: No


History Surgical Site Infection Following: None





- Disposition


Have Diagnosis and Disposition been Completed?: Yes


Diagnosis: 


 Asthma exacerbation, Chest pain, non-cardiac, Bronchitis





Disposition: HOME/ ROUTINE


Disposition Time: 18:32


Patient Plan: Discharge


Patient Problems: 


 Current Active Problems











Problem Status Onset


 


Asthma exacerbation Acute  


 


Chest pain, non-cardiac Acute  











Condition: IMPROVED


Discharge Instructions (ExitCare):  Acute Bronchitis, Adult (DC), Asthma, Adult 

(DC)


Additional Instructions: 


Call private doctor for follow up visit in 2 days.   Take medication as 

instructed with food.  Call cardiologist for revaluation.  Return to emergency 

if symptoms worsen.


I have prescribed Prednisone at your request.  Remember what we talked in the 

ER.  Prednisone is associated with diabetes, rare hip bone necrosis, glaucoma, 

osteoporosis, high blood pressure.  You could stop Prednisone at anytime.  If 

you are concern about the risk, please see your private doctor in 2 days.  


Prescriptions: 


Albuterol HFA [Ventolin HFA 90 mcg/actuation (8 g)] 2 puff IH [id number] PRN #120 

puff


 PRN Reason: Wheezing


Doxycycline Hyclate 100 mg PO BID #14 capsule


predniSONE [predniSONE Tab] 40 mg PO DAILY #6 tab


Referrals: 


Micheal Ly [Family Provider] - Follow up with primary


Forms:  [employer] (English)

## 2018-05-27 NOTE — RAD
HISTORY:

SOB



COMPARISON:

Comparison chest 04/30/2018 



FINDINGS:



LUNGS:

Poor inspiration with low lung volumes, crowded bronchovascular 

markings and mild bibasilar atelectasis 



PLEURA:

No significant pleural effusion identified, no pneumothorax apparent.



CARDIOVASCULAR:

Cardiomegaly. 



OSSEOUS STRUCTURES:

No significant abnormalities.



VISUALIZED UPPER ABDOMEN:

Normal.



OTHER FINDINGS:

None.



IMPRESSION:

Poor inspiration with low lung volumes, crowded bronchovascular 

markings and mild bibasilar atelectasis  Cardiomegaly.

## 2018-05-28 NOTE — CARD
--------------- APPROVED REPORT --------------





EKG Measurement

Heart [id number]

[id number]

[id number]

[id number]

[id number]



<Conclusion>

Normal sinus rhythm

Prolonged QT

Abnormal ECG

## 2018-05-28 NOTE — CARD
--------------- APPROVED REPORT --------------





EKG Measurement

Heart [id number]

RI [id number]

[id number]

[id number]

[id number]



<Conclusion>

*** Poor data quality, interpretation may be adversely affected

Normal sinus rhythm

Normal ECG

## 2018-10-14 ENCOUNTER — HOSPITAL ENCOUNTER (EMERGENCY)
Dept: HOSPITAL 42 - ED | Age: 68
Discharge: HOME | End: 2018-10-14
Payer: MEDICARE

## 2018-10-14 VITALS
OXYGEN SATURATION: 100 % | DIASTOLIC BLOOD PRESSURE: 63 MMHG | HEART RATE: 60 BPM | RESPIRATION RATE: 20 BRPM | SYSTOLIC BLOOD PRESSURE: 125 MMHG

## 2018-10-14 VITALS — BODY MASS INDEX: 34.9 KG/M2

## 2018-10-14 VITALS — TEMPERATURE: 97.7 F

## 2018-10-14 DIAGNOSIS — E78.5: ICD-10-CM

## 2018-10-14 DIAGNOSIS — J45.909: Primary | ICD-10-CM

## 2018-10-14 DIAGNOSIS — I25.10: ICD-10-CM

## 2018-10-14 DIAGNOSIS — I10: ICD-10-CM

## 2018-10-14 LAB
ALBUMIN SERPL-MCNC: 4.2 G/DL (ref 3–4.8)
ALBUMIN/GLOB SERPL: 1.2 {RATIO} (ref 1.1–1.8)
ALT SERPL-CCNC: 32 U/L (ref 7–56)
AST SERPL-CCNC: 27 U/L (ref 17–59)
BASOPHILS # BLD AUTO: 0.01 K/MM3 (ref 0–2)
BASOPHILS NFR BLD: 0.2 % (ref 0–3)
BUN SERPL-MCNC: 17 MG/DL (ref 7–21)
CALCIUM SERPL-MCNC: 9.1 MG/DL (ref 8.4–10.5)
EOSINOPHIL # BLD: 0.5 10*3/UL (ref 0–0.7)
EOSINOPHIL NFR BLD: 8.1 % (ref 1.5–5)
ERYTHROCYTE [DISTWIDTH] IN BLOOD BY AUTOMATED COUNT: 12.8 % (ref 11.5–14.5)
GFR NON-AFRICAN AMERICAN: > 60
GRANULOCYTES # BLD: 3.96 10*3/UL (ref 1.4–6.5)
GRANULOCYTES NFR BLD: 65.6 % (ref 50–68)
HGB BLD-MCNC: 13.5 G/DL (ref 14–18)
LYMPHOCYTES # BLD: 1.2 10*3/UL (ref 1.2–3.4)
LYMPHOCYTES NFR BLD AUTO: 20.1 % (ref 22–35)
MCH RBC QN AUTO: 30.3 PG (ref 25–35)
MCHC RBC AUTO-ENTMCNC: 34.8 G/DL (ref 31–37)
MCV RBC AUTO: 87 FL (ref 80–105)
MONOCYTES # BLD AUTO: 0.4 10*3/UL (ref 0.1–0.6)
MONOCYTES NFR BLD: 6 % (ref 1–6)
PLATELET # BLD: 189 10^3/UL (ref 120–450)
PMV BLD AUTO: 9.5 FL (ref 7–11)
RBC # BLD AUTO: 4.46 10^6/UL (ref 3.5–6.1)
TROPONIN I SERPL-MCNC: < 0.01 NG/ML
WBC # BLD AUTO: 6 10^3/UL (ref 4.5–11)

## 2018-10-14 PROCEDURE — 85025 COMPLETE CBC W/AUTO DIFF WBC: CPT

## 2018-10-14 PROCEDURE — 84484 ASSAY OF TROPONIN QUANT: CPT

## 2018-10-14 PROCEDURE — 93005 ELECTROCARDIOGRAM TRACING: CPT

## 2018-10-14 PROCEDURE — 71045 X-RAY EXAM CHEST 1 VIEW: CPT

## 2018-10-14 PROCEDURE — 96374 THER/PROPH/DIAG INJ IV PUSH: CPT

## 2018-10-14 PROCEDURE — 94640 AIRWAY INHALATION TREATMENT: CPT

## 2018-10-14 PROCEDURE — 80053 COMPREHEN METABOLIC PANEL: CPT

## 2018-10-14 PROCEDURE — 99284 EMERGENCY DEPT VISIT MOD MDM: CPT

## 2018-10-14 NOTE — CARD
--------------- APPROVED REPORT --------------





Date of service: 10/14/2018



EKG Measurement

Heart [id number]

TN [id number]

[id number]

[id number]

[id number]



<Conclusion>

Normal sinus rhythm

Normal ECG

## 2018-10-14 NOTE — RAD
Date of service: 



10/14/2018



HISTORY:

 sob 



COMPARISON:

5/27/2018 



FINDINGS:



LUNGS:

No active pulmonary disease.



PLEURA:

No significant pleural effusion identified, no pneumothorax apparent.



CARDIOVASCULAR:

Normal.



OSSEOUS STRUCTURES:

No significant abnormalities.



VISUALIZED UPPER ABDOMEN:

Normal.



OTHER FINDINGS:

None.



IMPRESSION:

No active disease.

## 2018-10-14 NOTE — ED PDOC
Arrival/HPI





- General


Chief Complaint: Cough, Cold, Congestion


Time Seen by Provider: 10/14/18 11:12


Historian: Patient





- History of Present Illness


Narrative History of Present Illness (Text): 





67 y/o M w/ h/o asthma, COPD, CAD (2 stents placed in 2015), hypertension, 

hyperlipidemia presenting to the emergency department complaining of non 

productive cough and left-sided chest pain for the past 3 days. He admits to not

possessing a nebulizer at home, however states he normally use a rescue inhaler 

at home twice daily. However patient has had no relief of the symptoms with 

using the pump. He also reports experiencing headaches that have been gradual in

nature with as well. Patient denies any fever, chills, or any other complaints 

at this time. 





No PMD








Time/Duration: < week (3 days)


Symptom Course: Unchanged


Severity Level: Moderate


Context: Home





Past Medical History





- Provider Review


Nursing Documentation Reviewed: Yes





- Travel History


Have you recently traveled outside [place] w/in the past 3 mons?: No





- Infectious Disease


Hx of Infectious Diseases: None





- Tetanus Immunization


Tetanus Immunization: Unknown





- Cardiac


Hx Cardiac Disorders: Yes


Hx Hypertension: Yes





- Pulmonary


Hx Respiratory Disorders: Yes


Hx Asthma: Yes





- Neurological


Hx Neurological Disorder: No





- HEENT


Hx HEENT Disorder: No


Hx Blind: No





- Renal


Hx Renal Disorder: No





- Endocrine/Metabolic


Hx Endocrine Disorders: No





- Hematological/Oncological


Hx Blood Disorders: No





- Integumentary


Hx Dermatological Disorder: No





- Musculoskeletal/Rheumatological


Hx Musculoskeletal Disorders: No





- Gastrointestinal


Hx Gastrointestinal Disorders: No





- Genitourinary/Gynecological


Hx Genitourinary Disorders: No





- Psychiatric


Hx Psychophysiologic Disorder: No


Hx Depression: No


Hx Emotional Abuse: No


Hx Physical Abuse: No


Hx Substance Use: No





- Past Surgical History


Past Surgical History: No Previous





- Surgical History


Hx Musculoskeletal Surgery: Yes (right knee)





- Anesthesia


Hx Anesthesia: Yes


Hx Anesthesia Reactions: No


Hx Malignant Hyperthermia: No





- Suicidal Assessment


Feels Threatened In Home Enviroment: No





Family/Social History





- Physician Review


Nursing Documentation Reviewed: Yes


Family/Social History: No Known Family HX


Smoking Status: Never Smoked


Hx Alcohol Use: No


Hx Substance Use: No


Hx Substance Use Treatment: No





Allergies/Home Meds


Allergies/Adverse Reactions: 


Allergies





No Known Allergies Allergy (Verified 10/14/18 11:05)


   








Home Medications: 


                                    Home Meds











 Medication  Instructions  Recorded  Confirmed


 


Atorvastatin [Lipitor] 40 mg PO HS 05/27/18 10/14/18


 


Dexlansoprazole [Dexilant] 60 mg pe PO DAILY 05/27/18 10/14/18


 


Mometasone/Formoterol [Dulera 200 1 puff IH DAILY 05/27/18 10/14/18





Mcg/5 Mcg Inhaler]   


 


Valsartan/Hydrochlorothiazide 1 tab PO DAILY 05/27/18 10/14/18





[Valsartan-Hctz 320-25 mg Tab]   


 


amLODIPine [Norvasc] 10 mg PO DAILY 05/27/18 10/14/18














Review of Systems





- Physician Review


All systems were reviewed & negative as marked: Yes





- Review of Systems


Constitutional: absent: Fevers, Night Sweats


Respiratory: Cough


Cardiovascular: Chest Pain (left-side)





Physical Exam


Vital Signs Reviewed: Yes





Vital Signs











  Temp Pulse Resp BP Pulse Ox


 


 10/14/18 11:03  97.7 F  66  18  130/80  99











Temperature: Afebrile


Blood Pressure: Normal


Pulse: Regular


Respiratory Rate: Normal


Appearance: Positive for: Well-Appearing, Non-Toxic, Comfortable


Pain Distress: None


Mental Status: Positive for: Alert and Oriented X 3





- Systems Exam


Head: Present: Atraumatic, Normocephalic


Pupils: Present: PERRL


Extroacular Muscles: Present: EOMI


Conjunctiva: Present: Normal


Mouth: Present: Moist Mucous Membranes


Neck: Present: Normal Range of Motion


Respiratory/Chest: Present: Wheezes (expiratory wheezing bilaterally), Decreased

 Breath Sounds.  No: Rales, Rhonchi


Cardiovascular: Present: Regular Rate and Rhythm, Normal S1, S2.  No: Murmurs


Abdomen: No: Tenderness, Distention, Peritoneal Signs


Back: Present: Normal Inspection


Upper Extremity: Present: Normal Inspection.  No: Cyanosis, Edema


Lower Extremity: Present: Normal Inspection.  No: Edema


Neurological: Present: GCS=15, CN II-XII Intact, Speech Normal


Skin: Present: Warm, Dry, Normal Color.  No: Rashes


Psychiatric: Present: Alert, Oriented x 3, Normal Insight, Normal Concentration





Medical Decision Making


ED Course and Treatment: 


10/14/18 11:29


Impression: 68 year old male with cough and left-side chest pain. Physical exam 

shows expiratory wheezing bilaterally, diminished breath sounds, no rales, no 

rhonchi. 





Differential Diagnoses Include But Are Not Limited To:


ACS


Asthma exacerbation


PNA








Plan:


-- EKG


-- Chest X-ray


-- Duoneb


-- SOLU-Medrol


-- Labs


-- Reassess and disposition





Prior Visits:


Notes and results from previous visits were reviewed. Patient was last seen in 

the emergency department on 05/27/2018 for intermittent shortness of breath. 

Patient was discharged home.





Progress Notes:


10/14/18 12:43


No leuocytosis noted with slight infiltrate noted on CXR. Patient reassessed 

with no wheezing on repeat auscultation. He reports needing vials for his 

nebulizer as well as an albuterol inhaler. The patient demonstrates 

understanding and will follow up with his PCP. Scripts provided. He is stable 

for discharge. 














- Lab Interpretations


I have reviewed the lab results: Yes





- RAD Interpretation


Narrative RAD Interpretations (Text): 





10/14/2018 12:46


Chest X-ray


IMPRESSION: No active disease.


Dictator: Miko Santa MD





Radiology Orders: 











10/14/18 11:12


CHEST PORTABLE [RAD] Stat 














- Medication Orders


Current Medication Orders: 














Discontinued Medications





Albuterol/Ipratropium (Duoneb 3 Mg/0.5 Mg (3 Ml) Ud)  3 ml IH STAT STA


   Stop: 10/14/18 11:14


Methylprednisolone (Solu-Medrol)  125 mg IVP STAT STA


   Stop: 10/14/18 11:14











- Scribe Statement


The provider has reviewed the documentation as recorded by the Yana Fernandez





Provider Scribe Attestation:


All medical record entries made by the Scribe were at my direction and perso

carol dictated by me. I have reviewed the chart and agree that the record 

accurately reflects my personal performance of the history, physical exam, 

medical decision making, and the department course for this patient. I have also

 personally directed, reviewed, and agree with the discharge instructions and 

disposition.








Disposition/Present on Arrival





- Present on Arrival


Any Indicators Present on Arrival: No


History of DVT/PE: No


History of Uncontrolled Diabetes: No


Urinary Catheter: No


History of Decub. Ulcer: No


History Surgical Site Infection Following: None





- Disposition


Have Diagnosis and Disposition been Completed?: Yes


Diagnosis: 


 Asthma attack





Disposition: HOME/ ROUTINE


Disposition Time: 12:48


Patient Plan: Discharge


Condition: STABLE


Discharge Instructions (ExitCare):  Asthma, Adult (DC)


Print Language: Pashto


Prescriptions: 


Albuterol HFA [Ventolin HFA 90 mcg/actuation (8 g)] 2 puff IH [id number] 2 Days #2 

puff


Albuterol 0.083% [Albuterol Sulfate 3 Ml] 0.5 ml IH Q6H 2 Days #2 neb


Azithromycin 500 mg PO DAILY 5 Days #5 tablet


Ipratropium 0.02% [Atrovent] 0.5 mg IH Q6H 2 Days #2 neb


Methylprednisolone [Medrol Dose Pack (21 tabs)] 4 mg PO DAILY #21 mg


Referrals: 


Aminta Osullivan MD [Medical Doctor] - Follow up with primary


[facility] [place] - Follow up with primary


Forms:  [employer] (Belizean), WORK NOTE

## 2019-01-02 ENCOUNTER — HOSPITAL ENCOUNTER (EMERGENCY)
Dept: HOSPITAL 42 - ED | Age: 69
LOS: 1 days | Discharge: HOME | End: 2019-01-03
Payer: MEDICARE

## 2019-01-02 VITALS — BODY MASS INDEX: 34.9 KG/M2

## 2019-01-02 DIAGNOSIS — M54.30: Primary | ICD-10-CM

## 2019-01-02 DIAGNOSIS — I10: ICD-10-CM

## 2019-01-02 DIAGNOSIS — J45.909: ICD-10-CM

## 2019-01-02 PROCEDURE — 96372 THER/PROPH/DIAG INJ SC/IM: CPT

## 2019-01-02 PROCEDURE — 99284 EMERGENCY DEPT VISIT MOD MDM: CPT

## 2019-01-03 VITALS
OXYGEN SATURATION: 100 % | SYSTOLIC BLOOD PRESSURE: 137 MMHG | DIASTOLIC BLOOD PRESSURE: 84 MMHG | HEART RATE: 67 BPM | RESPIRATION RATE: 18 BRPM | TEMPERATURE: 98.5 F

## 2019-01-03 NOTE — ED PDOC
Arrival/HPI





- General


Chief Complaint: Back Pain


Time Seen by Provider: 01/02/19 23:24


Historian: Patient





- History of Present Illness


Narrative History of Present Illness (Text): 





01/03/19 03:10


68 year old male, whose past medical history includes herniated disks and 

asthma, presents to the emergency department with back pain and inability to 

walk.  Patient states his pain worsened today to the point that he was unable to

bear his own weight while walking.  Patient informs his left leg has sharp 

shooting pains worse than his right leg. He reports worsening of his asthma 

lately.  Patient states he has been wheezing.  Patient denies any fevers, 

chills, headache, dizziness, chest pain, abdominal pain, nausea, vomiting, 

diarrhea, neck pain, or any other complaint.








Time/Duration: Prior to Arrival


Symptom Course: Unchanged


Activities at Onset: Rest


Context: Walking, Home





Past Medical History





- Provider Review


Nursing Documentation Reviewed: Yes





- Travel History


Have you recently traveled outside [place] w/in the past 3 mons?: No





- Infectious Disease


Hx of Infectious Diseases: None





- Tetanus Immunization


Tetanus Immunization: Unknown





- Cardiac


Hx Cardiac Disorders: Yes


Hx Hypertension: Yes





- Pulmonary


Hx Respiratory Disorders: Yes


Hx Asthma: Yes





- Neurological


Hx Neurological Disorder: No





- HEENT


Hx HEENT Disorder: No


Hx Blind: No





- Renal


Hx Renal Disorder: No





- Endocrine/Metabolic


Hx Endocrine Disorders: No





- Hematological/Oncological


Hx Blood Disorders: No





- Integumentary


Hx Dermatological Disorder: No





- Musculoskeletal/Rheumatological


Hx Musculoskeletal Disorders: No





- Gastrointestinal


Hx Gastrointestinal Disorders: No





- Genitourinary/Gynecological


Hx Genitourinary Disorders: No





- Psychiatric


Hx Psychophysiologic Disorder: No


Hx Depression: No


Hx Emotional Abuse: No


Hx Physical Abuse: No


Hx Substance Use: No





- Past Surgical History


Past Surgical History: No Previous





- Surgical History


Hx Musculoskeletal Surgery: Yes (right knee)





- Anesthesia


Hx Anesthesia: Yes


Hx Anesthesia Reactions: No


Hx Malignant Hyperthermia: No





- Suicidal Assessment


Feels Threatened In Home Enviroment: No





Family/Social History





- Physician Review


Nursing Documentation Reviewed: Yes


Family/Social History: No Known Family HX


Smoking Status: Never Smoked


Hx Alcohol Use: No


Hx Substance Use: No


Hx Substance Use Treatment: No





Allergies/Home Meds


Allergies/Adverse Reactions: 


Allergies





No Known Allergies Allergy (Verified 10/14/18 11:05)


   








Home Medications: 


                                    Home Meds











 Medication  Instructions  Recorded  Confirmed


 


Atorvastatin [Lipitor] 40 mg PO HS 05/27/18 10/14/18


 


Dexlansoprazole [Dexilant] 60 mg pe PO DAILY 05/27/18 10/14/18


 


Mometasone/Formoterol [Dulera 200 1 puff IH DAILY 05/27/18 10/14/18





Mcg/5 Mcg Inhaler]   


 


RX: amLODIPine [Norvasc] 10 mg PO DAILY 05/27/18 10/14/18


 


Valsartan/Hydrochlorothiazide 1 tab PO DAILY 05/27/18 10/14/18





[Valsartan-Hctz 320-25 mg Tab]   














Review of Systems





- Physician Review


All systems were reviewed & negative as marked: Yes





- Review of Systems


Constitutional: absent: Fevers, Night Sweats


Respiratory: SOB, Wheezing


Cardiovascular: absent: Chest Pain


Gastrointestinal: absent: Abdominal Pain, Diarrhea, Nausea, Vomiting


Musculoskeletal: Back Pain.  absent: Neck Pain


Neurological: absent: Headache, Dizziness





Physical Exam


Vital Signs Reviewed: Yes


Temperature: Afebrile


Blood Pressure: Normal


Pulse: Regular


Respiratory Rate: Normal


Appearance: Positive for: Well-Appearing, Non-Toxic, Comfortable


Pain Distress: None


Mental Status: Positive for: Alert and Oriented X 3





- Systems Exam


Head: Present: Atraumatic, Normocephalic


Pupils: Present: PERRL


Extroacular Muscles: Present: EOMI


Conjunctiva: Present: Normal


Mouth: Present: Moist Mucous Membranes


Neck: Present: Normal Range of Motion


Respiratory/Chest: Present: Good Air Exchange, Wheezes (faint expiratory wheezes

heard b/l).  No: Respiratory Distress, Accessory Muscle Use


Cardiovascular: Present: Regular Rate and Rhythm, Normal S1, S2.  No: Murmurs


Abdomen: No: Tenderness, Distention, Peritoneal Signs


Back: Present: Normal Inspection


Upper Extremity: Present: Normal Inspection.  No: Cyanosis, Edema


Lower Extremity: Present: Normal Inspection.  No: Edema


Neurological: Present: GCS=15, CN II-XII Intact, Speech Normal


Skin: Present: Warm, Dry, Normal Color.  No: Rashes


Psychiatric: Present: Alert, Oriented x 3, Normal Insight, Normal Concentration





Medical Decision Making


ED Course and Treatment: 





01/03/19 03:33


Impression: 68 year old male presents with back pain





Plan:


--Duonebs


-- Valium


-- Toradol


-- Lidocaine


-- Reassess and disposition





Prior Visits:


Notes and results from previous visits were reviewed.





Progress Notes:   


01/03/19 


Patient reports complete resolution in symptoms stating her is able to ambulate 

without assistance. Repeat pulmonary exam revels no audible wheezes. Patient 

advised to follow up with his PCP. He demonstrates understanding. Scripts 

provided. He is stable for discharge. 








- Medication Orders


Current Medication Orders: 














Discontinued Medications





Diazepam (Valium)  5 mg PO ONCE ONE; Protocol


   Stop: 01/02/19 23:28


Ketorolac Tromethamine (Toradol)  60 mg IM STAT STA


   Stop: 01/02/19 23:28


Lidocaine (Lidoderm)  1 ea TD ONCE ONE


   Stop: 01/02/19 23:28











- Scribe Statement


The provider has reviewed the documentation as recorded by the Julianneibsupa Tucker





Provider Scribe Attestation:


All medical record entries made by the Scribe were at my direction and 

personally dictated by me. I have reviewed the chart and agree that the record 

accurately reflects my personal performance of the history, physical exam, 

medical decision making, and the department course for this patient. I have also

personally directed, reviewed, and agree with the discharge instructions and 

disposition.











Disposition/Present on Arrival





- Present on Arrival


Any Indicators Present on Arrival: No


History of DVT/PE: No


History of Uncontrolled Diabetes: No


Urinary Catheter: No


History of Decub. Ulcer: No


History Surgical Site Infection Following: None





- Disposition


Have Diagnosis and Disposition been Completed?: Yes


Diagnosis: 


 Sciatica, Asthma





Disposition: HOME/ ROUTINE


Disposition Time: 03:33


Patient Plan: Discharge


Condition: IMPROVED


Forms:  [facility] (English)